# Patient Record
Sex: MALE | Race: BLACK OR AFRICAN AMERICAN | Employment: FULL TIME | URBAN - METROPOLITAN AREA
[De-identification: names, ages, dates, MRNs, and addresses within clinical notes are randomized per-mention and may not be internally consistent; named-entity substitution may affect disease eponyms.]

---

## 2017-09-19 ENCOUNTER — HOSPITAL ENCOUNTER (OUTPATIENT)
Dept: ENDOSCOPY | Age: 77
Discharge: HOME | End: 2017-09-19
Attending: INTERNAL MEDICINE | Admitting: INTERNAL MEDICINE

## 2017-09-19 LAB
GLUCOSE BLD-MCNC: 101 MG/DL (ref 70–99)
GLUCOSE BLD-MCNC: 105 MG/DL (ref 70–99)
MAGNESIUM: 2.9 MG/DL (ref 1.8–2.4)
PERFORMED ON: ABNORMAL
PERFORMED ON: ABNORMAL

## 2017-09-19 RX ORDER — SODIUM CHLORIDE 0.9 % (FLUSH) 0.9 %
10 SYRINGE (ML) INJECTION PRN
Status: CANCELLED | OUTPATIENT
Start: 2017-09-19

## 2017-09-19 RX ORDER — SODIUM CHLORIDE 0.9 % (FLUSH) 0.9 %
10 SYRINGE (ML) INJECTION EVERY 12 HOURS SCHEDULED
Status: CANCELLED | OUTPATIENT
Start: 2017-09-19

## 2017-09-19 RX ORDER — SODIUM CHLORIDE 9 MG/ML
INJECTION, SOLUTION INTRAVENOUS CONTINUOUS
Status: CANCELLED | OUTPATIENT
Start: 2017-09-19

## 2017-09-19 ASSESSMENT — ENCOUNTER SYMPTOMS: SHORTNESS OF BREATH: 0

## 2018-09-20 ENCOUNTER — HOSPITAL ENCOUNTER (OUTPATIENT)
Dept: ENDOSCOPY | Age: 78
Discharge: HOME | End: 2018-09-20
Attending: INTERNAL MEDICINE | Admitting: INTERNAL MEDICINE

## 2018-09-20 LAB
GLUCOSE BLD-MCNC: 86 MG/DL (ref 70–99)
GLUCOSE BLD-MCNC: 97 MG/DL (ref 70–99)
PERFORMED ON: NORMAL
PERFORMED ON: NORMAL

## 2018-09-20 NOTE — ANESTHESIA POST-OP
Anesthesia Post-op Note    Patient: Leopold Ramal  MRN: [id number]  YOB: 1940  Date of evaluation: 9/20/2018  Time:  10:47 AM     Procedure(s) Performed:     Last Vitals: There were no vitals taken for this visit.     Carlos Phase I:      Carlos Phase II:      Anesthesia Post Evaluation    Final anesthesia type: MAC  Patient location during evaluation: PACU  Patient participation: complete - patient participated  Level of consciousness: awake and alert  Airway patency: patent  Nausea & Vomiting: no nausea and no vomiting  Complications: no  Cardiovascular status: hemodynamically stable  Respiratory status: acceptable        Vianey Mansfield MD  10:47 AM

## 2018-09-20 NOTE — ANESTHESIA PRE-OP
needed for Flatulence    Historical Provider, MD   metFORMIN (GLUCOPHAGE) 500 MG tablet Take 500 mg by mouth daily (with breakfast)     Historical Provider, MD   esomeprazole Magnesium (NEXIUM) 20 MG PACK Take 20 mg by mouth daily    Historical Provider, MD   Cetirizine HCl (ZYRTEC ALLERGY) 10 MG CAPS Take 1 capsule by mouth daily    Historical Provider, MD       Current medications:    Current Outpatient Prescriptions   Medication Sig Dispense Refill    amLODIPine (NORVASC) 2.5 MG tablet Take 2.5 mg by mouth daily      rOPINIRole (REQUIP) 3 MG tablet Take 3 mg by mouth nightly      HYDROcodone-acetaminophen (NORCO) 5-325 MG per tablet Take 1 tablet by mouth every 6 hours as needed for Pain .  omalizumab (OMALIZUMAB) 150 MG injection Inject 300 mg into the skin Pt takes every 6 weeks      EPINEPHrine (EPIPEN) 0.3 MG/0.3ML SOAJ injection Inject 0.3 mg into the muscle as needed Use as directed for allergic reaction to xolair inj.       acetaminophen (TYLENOL) 500 MG tablet Take 500 mg by mouth every 6 hours as needed for Pain      Ascorbic Acid (VITAMIN C) 250 MG tablet Take 250 mg by mouth daily      aspirin 81 MG tablet Take 81 mg by mouth daily      ranitidine (ZANTAC) 150 MG tablet Take 150 mg by mouth 2 times daily as needed for Heartburn      Calcium-Vitamin D (CALTRATE 600 PLUS-VIT D PO) Take 1 tablet by mouth daily      clonazePAM (KLONOPIN) 0.5 MG tablet Take 1.5 mg by mouth daily       rosuvastatin (CRESTOR) 5 MG tablet Take 10 mg by mouth daily       polycarbophil (FIBERCON) 625 MG tablet Take 625 mg by mouth daily      fluticasone (FLONASE) 50 MCG/ACT nasal spray 1 spray by Nasal route daily      simethicone (GAS-X) 80 MG chewable tablet Take 80 mg by mouth every 6 hours as needed for Flatulence      metFORMIN (GLUCOPHAGE) 500 MG tablet Take 500 mg by mouth daily (with breakfast)       esomeprazole Magnesium (NEXIUM) 20 MG PACK Take 20 mg by mouth daily      Cetirizine HCl (ZYRTEC reviewed  Rhythm: regular  Rate: normal                    Neuro/Psych:   Negative Neuro/Psych ROS              GI/Hepatic/Renal:        (-) GERD       Endo/Other:    (+) DiabetesType II DM, , .                 Abdominal:           Vascular: negative vascular ROS. Anesthesia Plan      MAC     ASA 3       Induction: intravenous. Anesthetic plan and risks discussed with patient. Plan discussed with CRNA. Plan for MAC with standard ASA monitoring. Additional monitoring and lines as dictated by intra-op course, including GETA. Risks/benefits reviewed with patient and all anesthestic questions answered prior to procedure. NPO appropriate.          Lucinda Landon MD   9/20/2018

## 2020-11-18 ENCOUNTER — NURSE ONLY (OUTPATIENT)
Dept: PRIMARY CARE CLINIC | Age: 80
End: 2020-11-18

## 2020-11-18 PROCEDURE — 99211 OFF/OP EST MAY X REQ PHY/QHP: CPT | Performed by: NURSE PRACTITIONER

## 2020-11-21 LAB — SARS-COV-2, NAA: NOT DETECTED

## 2025-06-16 ENCOUNTER — HOSPITAL ENCOUNTER (EMERGENCY)
Age: 85
Discharge: HOME | End: 2025-06-16
Attending: STUDENT IN AN ORGANIZED HEALTH CARE EDUCATION/TRAINING PROGRAM
Payer: COMMERCIAL

## 2025-06-16 ENCOUNTER — APPOINTMENT (OUTPATIENT)
Dept: GENERAL RADIOLOGY | Age: 85
End: 2025-06-16
Payer: COMMERCIAL

## 2025-06-16 VITALS
RESPIRATION RATE: 18 BRPM | OXYGEN SATURATION: 98 % | HEIGHT: 74 IN | WEIGHT: 180 LBS | BODY MASS INDEX: 23.1 KG/M2 | SYSTOLIC BLOOD PRESSURE: 105 MMHG | TEMPERATURE: 97.8 F | HEART RATE: 68 BPM | DIASTOLIC BLOOD PRESSURE: 64 MMHG
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
## 2025-06-16 DIAGNOSIS — M25.532 LEFT WRIST PAIN: ICD-10-CM

## 2025-06-16 DIAGNOSIS — S52.572A OTHER CLOSED INTRA-ARTICULAR FRACTURE OF DISTAL END OF LEFT RADIUS, INITIAL ENCOUNTER: ICD-10-CM

## 2025-06-16 PROCEDURE — 29125 APPL SHORT ARM SPLINT STATIC: CPT

## 2025-06-16 PROCEDURE — 73130 X-RAY EXAM OF HAND: CPT

## 2025-06-16 PROCEDURE — 99283 EMERGENCY DEPT VISIT LOW MDM: CPT

## 2025-06-16 PROCEDURE — 73090 X-RAY EXAM OF FOREARM: CPT

## 2025-06-16 RX ORDER — TRAMADOL HYDROCHLORIDE 50 MG/1
TABLET ORAL
COMMUNITY

## 2025-06-16 RX ORDER — HYDROCODONE BITARTRATE AND ACETAMINOPHEN 5; 325 MG/1; MG/1
1 TABLET ORAL EVERY 6 HOURS PRN
Qty: 10 TABLET | Refills: 0 | Status: SHIPPED | OUTPATIENT
Start: 2025-06-16 | End: 2025-06-19

## 2025-06-16 RX ORDER — CARBIDOPA/LEVODOPA 10MG-100MG
1 TABLET ORAL 2 TIMES DAILY
COMMUNITY
Start: 2025-01-23

## 2025-06-16 RX ORDER — TORSEMIDE 20 MG/1
20 TABLET ORAL 2 TIMES DAILY
COMMUNITY
Start: 2025-04-17

## 2025-06-16 ASSESSMENT — PAIN SCALES - GENERAL
PAINLEVEL_OUTOF10: 5
PAINLEVEL_OUTOF10: 5

## 2025-06-16 ASSESSMENT — PAIN DESCRIPTION - ORIENTATION
ORIENTATION: LEFT
ORIENTATION: LEFT

## 2025-06-16 ASSESSMENT — PAIN DESCRIPTION - PAIN TYPE: TYPE: ACUTE PAIN

## 2025-06-16 ASSESSMENT — PAIN DESCRIPTION - LOCATION
LOCATION: WRIST
LOCATION: WRIST

## 2025-06-16 ASSESSMENT — PAIN DESCRIPTION - DESCRIPTORS: DESCRIPTORS: ACHING

## 2025-06-16 ASSESSMENT — PAIN - FUNCTIONAL ASSESSMENT: PAIN_FUNCTIONAL_ASSESSMENT: 0-10

## 2025-06-16 NOTE — ED PROVIDER NOTES
Emergency Department Provider Note  Location: [facility] EMERGENCY DEPARTMENT  6/16/2025     Patient Identification  Manish Cerrato is a 84 y.o. male    Chief Complaint  Fall (Pt reports on Thursday, was coming down the steps and legs gave way, fell. No loc sts put left hand up to cover face and fell on it. +pain left wrist and hand. +swelling. No deformity limited movement)      Mode of Arrival  private car    HPI  (History provided by patient and family member patient and son)  This is a 84 y.o. male with a PMH significant for diabetes, hypertension presented today for fall with left hand and forearm injury.  Patient reports that on Thursday he was coming on the steps when his legs gave way.  States that he fell landing directly on his left hand and wrist.  He denies any head trauma.  He reports increasing swelling.  Pain is mainly to the forearm and at the base of his left thumb.  He is right-handed.  Again adamantly denies any head trauma.  Denies any new neck or back pain.  Denies any chest pain or shortness of breath.      ROS  Review of Systems   Musculoskeletal:  Positive for arthralgias.   All other systems reviewed and are negative.        I have reviewed the following nursing documentation:  Allergies:   Allergies   Allergen Reactions    Ambien [Zolpidem] Other (See Comments)    Aliskiren Other (See Comments)    Aspirin Nausea Only and Other (See Comments)     GI Upset (per OSH chart)    Losartan Other (See Comments)    Solifenacin Hives       Past medical history:  has a past medical history of Colon cancer (HCC), Diabetes mellitus (HCC), Hyperlipidemia, Hypertension, and Sleep apnea.    Past surgical history:  has a past surgical history that includes Colon surgery and Abdomen surgery.    Home medications:   Prior to Admission medications    Medication Sig Start Date End Date Taking? Authorizing Provider   traMADol (ULTRAM) 50 MG tablet PLEASE SEE ATTACHED FOR DETAILED DIRECTIONS   Yes Provider,

## 2025-06-18 ENCOUNTER — TELEPHONE (OUTPATIENT)
Dept: ORTHOPEDIC SURGERY | Age: 85
End: 2025-06-18

## 2025-06-18 NOTE — TELEPHONE ENCOUNTER
----- Message from Cami BALL sent at 6/18/2025 10:13 AM EDT -----  Regarding: Specialist Appointment Request - New  Specialist Appointment Request - New    Referral on File?: Yes/No: No    What Specialty? Select Speciality: Orthopedics     Reason for referral or appointment? Fx Lt Wrist     Scheduling Preference per Patient: need to see Dr. Sousa       Additional Information: patient melia Hammond is calling to get his father and appointment with Dr. Sousa for his fracture Wrist. He just need a call back. Please Advise.   --------------------------------------------------------------------------------------------------------------------------    Relationship to Patient: Other Melia Hammond   Call Back Information: OK to leave message on voicemail  Preferred Call Back Number: [phone]

## 2025-06-20 ENCOUNTER — OFFICE VISIT (OUTPATIENT)
Dept: ORTHOPEDIC SURGERY | Age: 85
End: 2025-06-20

## 2025-06-20 VITALS — BODY MASS INDEX: 23.1 KG/M2 | WEIGHT: 180 LBS | HEIGHT: 74 IN

## 2025-06-20 DIAGNOSIS — R52 PAIN: Primary | ICD-10-CM

## 2025-06-20 DIAGNOSIS — S52.532A CLOSED COLLES' FRACTURE OF LEFT RADIUS, INITIAL ENCOUNTER: ICD-10-CM

## 2025-06-20 NOTE — PROGRESS NOTES
Dr Paddy Sousa      Date /Time 2025       11:08 AM EDT  Name Manish Cerrato             1940   Location  [facility]  MRN [id number]                Chief Complaint   Patient presents with    Follow-up     N Left Wrist - Distal Radius FX ER           History of Present Illness  Manish Cerrato is a 84 y.o. male who presents with  left wrist pain.    Sent in consultation by Chauncey Santacruz MD, .      Injury Mechanism:  none.  Worker's Comp. & legal issues:   none.  Previous Treatments: Ice, Heat, and NSAIDs    Patient presents to the office today for a new problem.  Patient here with a chief complaint of left wrist pain.  Patient's left wrist became painful on 2020 waiting for a.  Patient did slip and fall.  Patient was seen in the emergency room.  Patient diagnosed with distal radius fracture and was placed into a splint.  He is here for follow-up.  Pain concentrated distal radius region.    Past History  Past Medical History:   Diagnosis Date    Colon cancer (HCC)     Diabetes mellitus (HCC)     Hyperlipidemia     Hypertension     Sleep apnea     does not use CPAP     Past Surgical History:   Procedure Laterality Date    ABDOMEN SURGERY      removal abdominal scar tissue    COLON SURGERY       Social History     Tobacco Use    Smoking status: Former     Current packs/day: 0.00     Types: Cigarettes     Quit date: 1978     Years since quittin.0    Smokeless tobacco: Never   Substance Use Topics    Alcohol use: No     Alcohol/week: 0.0 standard drinks of alcohol      Current Outpatient Medications on File Prior to Visit   Medication Sig Dispense Refill    traMADol (ULTRAM) 50 MG tablet PLEASE SEE ATTACHED FOR DETAILED DIRECTIONS      apixaban (ELIQUIS) 5 MG TABS tablet Take 1 tablet by mouth 2 times daily      torsemide (DEMADEX) 20 MG tablet Take 1 tablet by mouth 2 times daily      carbidopa-levodopa (SINEMET)  MG per tablet Take 1 tablet by mouth 2 times daily

## 2025-07-03 ENCOUNTER — OFFICE VISIT (OUTPATIENT)
Dept: ORTHOPEDIC SURGERY | Age: 85
End: 2025-07-03
Payer: COMMERCIAL

## 2025-07-03 VITALS — HEIGHT: 74 IN | WEIGHT: 180 LBS | BODY MASS INDEX: 23.1 KG/M2

## 2025-07-03 DIAGNOSIS — S52.532A CLOSED COLLES' FRACTURE OF LEFT RADIUS, INITIAL ENCOUNTER: Primary | ICD-10-CM

## 2025-07-03 PROCEDURE — 99213 OFFICE O/P EST LOW 20 MIN: CPT | Performed by: ORTHOPAEDIC SURGERY

## 2025-07-03 PROCEDURE — 1123F ACP DISCUSS/DSCN MKR DOCD: CPT | Performed by: ORTHOPAEDIC SURGERY

## 2025-07-03 NOTE — PROGRESS NOTES
carbidopa-levodopa (SINEMET)  MG per tablet Take 1 tablet by mouth 2 times daily      amLODIPine (NORVASC) 2.5 MG tablet Take 2.5 mg by mouth daily      rOPINIRole (REQUIP) 3 MG tablet Take 3 mg by mouth nightly      HYDROcodone-acetaminophen (NORCO) 5-325 MG per tablet Take 1 tablet by mouth every 6 hours as needed for Pain . (Patient not taking: Reported on 6/16/2025)      omalizumab (OMALIZUMAB) 150 MG injection Inject 300 mg into the skin Pt takes every 6 weeks      EPINEPHrine (EPIPEN) 0.3 MG/0.3ML SOAJ injection Inject 0.3 mg into the muscle as needed Use as directed for allergic reaction to xolair inj.      acetaminophen (TYLENOL) 500 MG tablet Take 500 mg by mouth every 6 hours as needed for Pain      Ascorbic Acid (VITAMIN C) 250 MG tablet Take 250 mg by mouth daily      aspirin 81 MG tablet Take 81 mg by mouth daily      ranitidine (ZANTAC) 150 MG tablet Take 150 mg by mouth 2 times daily as needed for Heartburn      Calcium-Vitamin D (CALTRATE 600 PLUS-VIT D PO) Take 1 tablet by mouth daily      clonazePAM (KLONOPIN) 0.5 MG tablet Take 1.5 mg by mouth daily       rosuvastatin (CRESTOR) 5 MG tablet Take 10 mg by mouth daily       polycarbophil (FIBERCON) 625 MG tablet Take 625 mg by mouth daily      fluticasone (FLONASE) 50 MCG/ACT nasal spray 1 spray by Nasal route daily      simethicone (GAS-X) 80 MG chewable tablet Take 80 mg by mouth every 6 hours as needed for Flatulence      metFORMIN (GLUCOPHAGE) 500 MG tablet Take 500 mg by mouth daily (with breakfast)       esomeprazole Magnesium (NEXIUM) 20 MG PACK Take 20 mg by mouth daily      Cetirizine HCl (ZYRTEC ALLERGY) 10 MG CAPS Take 1 capsule by mouth daily       No current facility-administered medications on file prior to visit.        Review of Systems  10-point ROS is negative other than HPI.    Physical Exam  Based off 1997 Exam Criteria  Ht 1.88 m (6' 2.02\")   Wt 81.6 kg (180 lb)   BMI 23.10 kg/m²      Constitutional:       General: He is

## 2025-07-18 ENCOUNTER — OFFICE VISIT (OUTPATIENT)
Dept: ORTHOPEDIC SURGERY | Age: 85
End: 2025-07-18

## 2025-07-18 VITALS — WEIGHT: 180 LBS | HEIGHT: 74 IN | BODY MASS INDEX: 23.1 KG/M2

## 2025-07-18 DIAGNOSIS — S52.532A CLOSED COLLES' FRACTURE OF LEFT RADIUS, INITIAL ENCOUNTER: Primary | ICD-10-CM

## 2025-07-18 NOTE — PROGRESS NOTES
swelling or worsening of the condition.       Assessment and Plan  Manish was seen today for follow-up.    Diagnoses and all orders for this visit:    Closed Colles' fracture of left radius, initial encounter  -     XR WRIST LEFT (MIN 3 VIEWS); Future        Patient doing well.  Discontinue cast.  He was placed into a brace.  Continue nonweightbearing left upper extremity.  He can remove it and work on gentle range of motion.  Follow-up in 4 weeks for x-rays and likely discontinuation of brace.  Possible need for hand therapy depending on his function.    I discussed with Manish Cerrato that his history, symptoms, signs, and imaging are most consistent with distal radius fracture.      Electronically signed by Paddy Sousa MD on 7/18/2025 at 10:05 AM  This dictation was generated by voice recognition computer software.  Although all attempts are made to edit the dictation for accuracy, there may be errors in the transcription that are not intended.

## 2025-08-15 ENCOUNTER — OFFICE VISIT (OUTPATIENT)
Dept: ORTHOPEDIC SURGERY | Age: 85
End: 2025-08-15

## 2025-08-15 VITALS — HEIGHT: 74 IN | WEIGHT: 180 LBS | BODY MASS INDEX: 23.1 KG/M2

## 2025-08-15 DIAGNOSIS — S52.532A CLOSED COLLES' FRACTURE OF LEFT RADIUS, INITIAL ENCOUNTER: Primary | ICD-10-CM

## 2025-08-15 PROCEDURE — 99024 POSTOP FOLLOW-UP VISIT: CPT | Performed by: ORTHOPAEDIC SURGERY
